# Patient Record
Sex: MALE | Race: WHITE | NOT HISPANIC OR LATINO | Employment: PART TIME | URBAN - METROPOLITAN AREA
[De-identification: names, ages, dates, MRNs, and addresses within clinical notes are randomized per-mention and may not be internally consistent; named-entity substitution may affect disease eponyms.]

---

## 2020-03-14 ENCOUNTER — HOSPITAL ENCOUNTER (EMERGENCY)
Facility: HOSPITAL | Age: 25
Discharge: HOME | End: 2020-03-14
Attending: EMERGENCY MEDICINE
Payer: COMMERCIAL

## 2020-03-14 VITALS
WEIGHT: 150 LBS | HEIGHT: 68 IN | RESPIRATION RATE: 16 BRPM | SYSTOLIC BLOOD PRESSURE: 122 MMHG | TEMPERATURE: 99 F | BODY MASS INDEX: 22.73 KG/M2 | HEART RATE: 68 BPM | DIASTOLIC BLOOD PRESSURE: 76 MMHG | OXYGEN SATURATION: 99 %

## 2020-03-14 DIAGNOSIS — T15.91XA FOREIGN BODY OF RIGHT EYE, INITIAL ENCOUNTER: Primary | ICD-10-CM

## 2020-03-14 DIAGNOSIS — S05.01XA ABRASION OF RIGHT CORNEA, INITIAL ENCOUNTER: ICD-10-CM

## 2020-03-14 PROCEDURE — 65222 REMOVE FOREIGN BODY FROM EYE: CPT

## 2020-03-14 PROCEDURE — 25000003 PHARM REV CODE 250

## 2020-03-14 PROCEDURE — 25000003 PHARM REV CODE 250: Performed by: EMERGENCY MEDICINE

## 2020-03-14 PROCEDURE — 99283 EMERGENCY DEPT VISIT LOW MDM: CPT | Mod: 25

## 2020-03-14 RX ORDER — TETRACAINE HYDROCHLORIDE 5 MG/ML
2 SOLUTION OPHTHALMIC
Status: COMPLETED | OUTPATIENT
Start: 2020-03-14 | End: 2020-03-14

## 2020-03-14 RX ORDER — ERYTHROMYCIN 5 MG/G
OINTMENT OPHTHALMIC
Qty: 1 TUBE | Refills: 0 | Status: SHIPPED | OUTPATIENT
Start: 2020-03-14

## 2020-03-14 RX ADMIN — TETRACAINE HYDROCHLORIDE 2 DROP: 5 SOLUTION OPHTHALMIC at 01:03

## 2020-03-14 RX ADMIN — FLUORESCEIN SODIUM 1 EACH: 1 STRIP OPHTHALMIC at 01:03

## 2020-03-14 NOTE — DISCHARGE INSTRUCTIONS
Erythromycin as directed  Please follow-up with the eye specialist as directed for further evaluation definitive care  Return for any concerns

## 2020-03-14 NOTE — ED PROVIDER NOTES
Encounter Date: 3/14/2020       History     Chief Complaint   Patient presents with    Eye Pain     Pt states he thinks he has metal in his R eye.      25-year-old male presents to the emergency department with complaint of foreign body sensation to his right eye that started on Tuesday.  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  Patient reports that his last tetanus was 1 year ago.        Review of patient's allergies indicates:  No Known Allergies  No past medical history on file.  No past surgical history on file.  No family history on file.  Social History     Tobacco Use    Smoking status: Not on file   Substance Use Topics    Alcohol use: Not on file    Drug use: Not on file     Review of Systems   Constitutional: Negative.    HENT: Negative.    Eyes: Positive for pain.   Respiratory: Negative.    Cardiovascular: Negative.    Genitourinary: Negative.    Musculoskeletal: Negative.    Neurological: Negative.    Hematological: Negative.    Psychiatric/Behavioral: Negative.        Physical Exam     Initial Vitals [03/14/20 1239]   BP Pulse Resp Temp SpO2   131/77 65 16 98.1 °F (36.7 °C) 100 %      MAP       --         Physical Exam    Nursing note and vitals reviewed.  Constitutional: He appears well-developed and well-nourished.   HENT:   Head: Normocephalic and atraumatic.   Right Ear: External ear normal.   Left Ear: External ear normal.   Nose: Nose normal.   Mouth/Throat: Oropharynx is clear and moist.   Eyes: Conjunctivae and EOM are normal. Pupils are equal, round, and reactive to light. Foreign body present in the right eye.       Foreign body noted at 12:00 p.m. position         ED Course   Foreign Body  Date/Time: 3/14/2020 2:07 PM  Performed by: AUTUMN James  Authorized by: Nilton Lucia Jr., MD   Body area: eye  Location details: right conjunctiva    Anesthesia:  Local Anesthetic: tetracaine drops  Patient sedated: no  Patient restrained: no  Localization method: slit  lamp  Removal mechanism: moist cotton swab  Eye examined with fluorescein.  Corneal abrasion size: small  Corneal abrasion location: inferior  No residual rust ring present.  Dressing: antibiotic ointment  Depth: superficial  Complexity: simple  Post-procedure assessment: foreign body removed  Patient tolerance: Patient tolerated the procedure well with no immediate complications      Labs Reviewed - No data to display       Imaging Results    None                                          Clinical Impression:       ICD-10-CM ICD-9-CM   1. Foreign body of right eye, initial encounter T15.91XA 930.9     E914   2. Abrasion of right cornea, initial encounter S05.01XA 918.1                                AUTUMN James  03/14/20 1410